# Patient Record
Sex: MALE | Race: WHITE | Employment: FULL TIME | URBAN - METROPOLITAN AREA
[De-identification: names, ages, dates, MRNs, and addresses within clinical notes are randomized per-mention and may not be internally consistent; named-entity substitution may affect disease eponyms.]

---

## 2017-06-05 PROCEDURE — 82746 ASSAY OF FOLIC ACID SERUM: CPT | Performed by: FAMILY MEDICINE

## 2017-06-05 PROCEDURE — 82607 VITAMIN B-12: CPT | Performed by: FAMILY MEDICINE

## 2017-06-13 PROBLEM — R20.0 LEFT LEG NUMBNESS: Status: ACTIVE | Noted: 2017-06-13

## 2017-06-13 PROBLEM — M21.372 LEFT FOOT DROP: Status: ACTIVE | Noted: 2017-06-13

## 2017-06-13 PROCEDURE — 86618 LYME DISEASE ANTIBODY: CPT | Performed by: OTHER

## 2017-06-13 PROCEDURE — 36415 COLL VENOUS BLD VENIPUNCTURE: CPT | Performed by: OTHER

## 2017-06-13 PROCEDURE — 83921 ORGANIC ACID SINGLE QUANT: CPT | Performed by: OTHER

## 2017-12-18 PROBLEM — I72.3 COMMON ILIAC ANEURYSM (HCC): Status: ACTIVE | Noted: 2017-12-18

## 2018-08-08 PROBLEM — S42.032A: Status: ACTIVE | Noted: 2018-08-08

## 2018-08-27 PROBLEM — S42.032A CLOSED DISPLACED FRACTURE OF ACROMIAL END OF LEFT CLAVICLE, INITIAL ENCOUNTER: Status: ACTIVE | Noted: 2018-08-27

## 2018-09-05 PROBLEM — Z47.89 ORTHOPEDIC AFTERCARE: Status: ACTIVE | Noted: 2018-09-05

## 2018-10-06 PROBLEM — M89.8X1 PAIN OF LEFT CLAVICLE: Status: ACTIVE | Noted: 2018-10-06

## 2019-03-13 PROBLEM — T85.848S PAIN FROM IMPLANTED HARDWARE, SEQUELA: Status: ACTIVE | Noted: 2019-03-13

## 2019-07-22 PROBLEM — M17.11 PRIMARY OSTEOARTHRITIS OF RIGHT KNEE: Status: ACTIVE | Noted: 2019-07-22

## 2024-04-11 ENCOUNTER — APPOINTMENT (OUTPATIENT)
Dept: GENERAL RADIOLOGY | Facility: HOSPITAL | Age: 69
End: 2024-04-11
Payer: MEDICARE

## 2024-04-11 ENCOUNTER — HOSPITAL ENCOUNTER (EMERGENCY)
Facility: HOSPITAL | Age: 69
Discharge: HOME | End: 2024-04-11
Attending: EMERGENCY MEDICINE
Payer: MEDICARE

## 2024-04-11 ENCOUNTER — APPOINTMENT (OUTPATIENT)
Dept: CT IMAGING | Facility: HOSPITAL | Age: 69
End: 2024-04-11
Attending: EMERGENCY MEDICINE
Payer: MEDICARE

## 2024-04-11 VITALS
TEMPERATURE: 97 F | BODY MASS INDEX: 25.49 KG/M2 | WEIGHT: 205 LBS | HEIGHT: 75 IN | OXYGEN SATURATION: 96 % | RESPIRATION RATE: 18 BRPM | HEART RATE: 58 BPM | DIASTOLIC BLOOD PRESSURE: 80 MMHG | SYSTOLIC BLOOD PRESSURE: 122 MMHG

## 2024-04-11 DIAGNOSIS — R91.1 PULMONARY NODULE: Primary | ICD-10-CM

## 2024-04-11 DIAGNOSIS — R07.9 ACUTE CHEST PAIN: ICD-10-CM

## 2024-04-11 LAB
ALBUMIN SERPL-MCNC: 3.4 G/DL (ref 3.4–5)
ALBUMIN/GLOB SERPL: 0.9 {RATIO} (ref 1–2)
ALP LIVER SERPL-CCNC: 75 U/L
ALT SERPL-CCNC: 32 U/L
ANION GAP SERPL CALC-SCNC: 5 MMOL/L (ref 0–18)
AST SERPL-CCNC: 40 U/L (ref 15–37)
ATRIAL RATE: 57 BPM
BASOPHILS # BLD AUTO: 0.03 X10(3) UL (ref 0–0.2)
BASOPHILS NFR BLD AUTO: 0.5 %
BILIRUB SERPL-MCNC: 0.8 MG/DL (ref 0.1–2)
BUN BLD-MCNC: 20 MG/DL (ref 9–23)
CALCIUM BLD-MCNC: 9.2 MG/DL (ref 8.5–10.1)
CHLORIDE SERPL-SCNC: 106 MMOL/L (ref 98–112)
CO2 SERPL-SCNC: 24 MMOL/L (ref 21–32)
CREAT BLD-MCNC: 0.79 MG/DL
D DIMER PPP FEU-MCNC: 1.23 UG/ML FEU (ref ?–0.67)
EGFRCR SERPLBLD CKD-EPI 2021: 97 ML/MIN/1.73M2 (ref 60–?)
EOSINOPHIL # BLD AUTO: 0.16 X10(3) UL (ref 0–0.7)
EOSINOPHIL NFR BLD AUTO: 2.5 %
ERYTHROCYTE [DISTWIDTH] IN BLOOD BY AUTOMATED COUNT: 14.9 %
GLOBULIN PLAS-MCNC: 3.8 G/DL (ref 2.8–4.4)
GLUCOSE BLD-MCNC: 96 MG/DL (ref 70–99)
HCT VFR BLD AUTO: 40.1 %
HGB BLD-MCNC: 12.8 G/DL
IMM GRANULOCYTES # BLD AUTO: 0.02 X10(3) UL (ref 0–1)
IMM GRANULOCYTES NFR BLD: 0.3 %
LYMPHOCYTES # BLD AUTO: 1.17 X10(3) UL (ref 1–4)
LYMPHOCYTES NFR BLD AUTO: 18.5 %
MCH RBC QN AUTO: 27.5 PG (ref 26–34)
MCHC RBC AUTO-ENTMCNC: 31.9 G/DL (ref 31–37)
MCV RBC AUTO: 86.2 FL
MONOCYTES # BLD AUTO: 0.68 X10(3) UL (ref 0.1–1)
MONOCYTES NFR BLD AUTO: 10.7 %
NEUTROPHILS # BLD AUTO: 4.28 X10 (3) UL (ref 1.5–7.7)
NEUTROPHILS # BLD AUTO: 4.28 X10(3) UL (ref 1.5–7.7)
NEUTROPHILS NFR BLD AUTO: 67.5 %
OSMOLALITY SERPL CALC.SUM OF ELEC: 282 MOSM/KG (ref 275–295)
P AXIS: 69 DEGREES
P-R INTERVAL: 168 MS
PLATELET # BLD AUTO: 166 10(3)UL (ref 150–450)
POTASSIUM SERPL-SCNC: 4.2 MMOL/L (ref 3.5–5.1)
PROT SERPL-MCNC: 7.2 G/DL (ref 6.4–8.2)
Q-T INTERVAL: 420 MS
QRS DURATION: 76 MS
QTC CALCULATION (BEZET): 408 MS
R AXIS: 47 DEGREES
RBC # BLD AUTO: 4.65 X10(6)UL
SODIUM SERPL-SCNC: 135 MMOL/L (ref 136–145)
T AXIS: 54 DEGREES
TROPONIN I SERPL HS-MCNC: 5 NG/L
VENTRICULAR RATE: 57 BPM
WBC # BLD AUTO: 6.3 X10(3) UL (ref 4–11)

## 2024-04-11 PROCEDURE — 71275 CT ANGIOGRAPHY CHEST: CPT | Performed by: EMERGENCY MEDICINE

## 2024-04-11 PROCEDURE — 99285 EMERGENCY DEPT VISIT HI MDM: CPT

## 2024-04-11 PROCEDURE — 80053 COMPREHEN METABOLIC PANEL: CPT

## 2024-04-11 PROCEDURE — 80053 COMPREHEN METABOLIC PANEL: CPT | Performed by: EMERGENCY MEDICINE

## 2024-04-11 PROCEDURE — 84484 ASSAY OF TROPONIN QUANT: CPT

## 2024-04-11 PROCEDURE — 93010 ELECTROCARDIOGRAM REPORT: CPT

## 2024-04-11 PROCEDURE — 96374 THER/PROPH/DIAG INJ IV PUSH: CPT

## 2024-04-11 PROCEDURE — 99284 EMERGENCY DEPT VISIT MOD MDM: CPT

## 2024-04-11 PROCEDURE — 93005 ELECTROCARDIOGRAM TRACING: CPT

## 2024-04-11 PROCEDURE — 85025 COMPLETE CBC W/AUTO DIFF WBC: CPT | Performed by: EMERGENCY MEDICINE

## 2024-04-11 PROCEDURE — 85379 FIBRIN DEGRADATION QUANT: CPT | Performed by: EMERGENCY MEDICINE

## 2024-04-11 PROCEDURE — 84484 ASSAY OF TROPONIN QUANT: CPT | Performed by: EMERGENCY MEDICINE

## 2024-04-11 PROCEDURE — 85025 COMPLETE CBC W/AUTO DIFF WBC: CPT

## 2024-04-11 PROCEDURE — 71045 X-RAY EXAM CHEST 1 VIEW: CPT | Performed by: EMERGENCY MEDICINE

## 2024-04-11 RX ORDER — KETOROLAC TROMETHAMINE 15 MG/ML
15 INJECTION, SOLUTION INTRAMUSCULAR; INTRAVENOUS ONCE
Status: COMPLETED | OUTPATIENT
Start: 2024-04-11 | End: 2024-04-11

## 2024-04-11 NOTE — ED INITIAL ASSESSMENT (HPI)
Pt c/o chest pain and neck stiffness x2 days after riding his bike outside. Pt states it hurts to cough on the left side and taking a deep breath. Pt laying prone at night, hurts more. Pt denies fevers, SOB. Denies cardiac hx

## 2024-04-11 NOTE — ED PROVIDER NOTES
Patient Seen in: [facility] Emergency Department      History     Chief Complaint   Patient presents with    Chest Pain Angina     Stated Complaint: chest pain, neck stiffness    Subjective:   HPI    This is a 69-year-old male past medical history of distal thoracic aneurysm  who presents with chest pain, left-sided neck pain.  Patient states that his left side of his neck feels slightly stiff.  He was riding his bike on Sunday.  In addition he is has pain underneath his left breast that has been fairly constant since Sunday as well.  He feels like it sometimes radiates up to his neck if he takes a deep breath.  He feels a little short of breath with exertion over the last couple of days.  He has no significant cardiac history.  He does not smoke tobacco.  He drinks alcohol socially.  He did drive to [place] last week 3 to 4 hours each way.  He has no history of PE or DVT.  No family history of clotting or early coronary artery disease.  He did not take anything to try to alleviate it.  He states it seems worse at night when he is lying flat.  Because it was not getting better he presents here for further evaluation.  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  He rides on his bike several hours per week.  He never gets chest pain while he is doing that.  He denies any myalgias or muscle pains.  His urine is clear.  He presents here for further evaluation.    Objective:   Past Medical History:    AAA (abdominal aortic aneurysm) (HCC)    Common iliac aneurysm (HCC)              Past Surgical History:   Procedure Laterality Date    Cataract extraction      Colonoscopy      2011                Social History     Socioeconomic History    Marital status:    Tobacco Use    Smoking status: Never    Smokeless tobacco: Never   Substance and Sexual Activity    Alcohol use: Yes     Alcohol/week: 6.0 standard drinks of alcohol     Types: 6 Standard drinks or equivalent per week     Comment: occ    Drug use: No    Social History Narrative    marital status:     occupation: software development    caffeine: few cups day    blood tranfusion: no    hiv exposure: no    travel: no    exercise: triathlons    mammo:      dexa:      pap:      colonoscopy:  2011 ok    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  Constitutional and vital signs reviewed.      All other systems reviewed and negative except as noted above.    Physical Exam     ED Triage Vitals [04/11/24 0932]   /82   Pulse 65   Resp 20   Temp 96.9 °F (36.1 °C)   Temp src Temporal   SpO2 95 %   O2 Device None (Room air)       Current:/84   Pulse 62   Temp 96.9 °F (36.1 °C) (Temporal)   Resp 18   Ht 190.5 cm (6' 3\")   Wt 93 kg   SpO2 98%   BMI 25.62 kg/m²         Physical Exam    GENERAL: Awake, alert oriented x3, nontoxic appearing.   SKIN: Normal, warm, and dry.  HEENT:  Pupils equally round and reactive to light. Conjuctiva clear.  Oropharynx is clear and moist.   Lungs: Clear to auscultation bilaterally with no rales, no retractions, and no wheezing.  HEART:  Regular rate and rhythm. S1 and S2. No murmurs, no rubs or gallops.   Chest: Tender over the left anterior chest wall.  No bruising or crepitus noted.  ABDOMEN: Soft, nontender and nondistended. Normoactive bowel sounds. No rebound. No guarding.   EXTREMITIES: No lower extremity edema.  No calf pain or tenderness.  Warm with brisk capillary refill.         ED Course     Labs Reviewed   COMP METABOLIC PANEL (14) - Abnormal; Notable for the following components:       Result Value    Sodium 135 (*)     AST 40 (*)     A/G Ratio 0.9 (*)     All other components within normal limits   D-DIMER - Abnormal; Notable for the following components:    D-Dimer 1.23  (*)     All other components within normal limits   CBC W/ DIFFERENTIAL - Abnormal; Notable for the following components:    HGB 12.8 (*)     All other components within normal limits   TROPONIN I HIGH SENSITIVITY - Normal   CBC WITH DIFFERENTIAL WITH PLATELET    Narrative:     The following orders were created for panel order CBC With Differential With Platelet.  Procedure                               Abnormality         Status                     ---------                               -----------         ------                     CBC W/ DIFFERENTIAL[[id number]]          Abnormal            Final result                 Please view results for these tests on the individual orders.   RAINBOW DRAW LAVENDER   RAINBOW DRAW LIGHT GREEN   RAINBOW DRAW BLUE     EKG    Rate, intervals and axes as noted on EKG Report.  Rate: 57  Rhythm: Sinus Rhythm  Reading: No acute changes               CT ANGIOGRAPHY, CHEST (CPT=71275)    Result Date: 4/11/2024  PROCEDURE:  CT ANGIOGRAPHY, CHEST (CPT=71275)  COMPARISON:  None.  INDICATIONS:  chest pain, neck stiffness  TECHNIQUE:  IV contrast-enhanced multislice CT angiography is performed through the pulmonary arterial anatomy. 3D volume renderings are generated.  Dose reduction techniques were used. Dose information is transmitted to the ACR (American College of Radiology) NRDR (National Radiology Data Registry) which includes the Dose Index Registry.  PATIENT STATED HISTORY:(As transcribed by Technologist)  Patient with left upper chest pain and neck stiffness.   CONTRAST USED:  100cc of Isovue 370  FINDINGS:  VASCULATURE:  Normal 3 vessel arch.  No evidence of aortic dissection.  No acute pulmonary embolus. LUNGS:  Dependent atelectasis bilaterally.  No focal consolidation.  No pneumothorax.  3 mm right upper lobe nodule on image number 15. MEDIASTINUM/VIDHYA:  No mediastinal or hilar adenopathy is noted.  Calcified subcarinal lymph nodes. PLEURA:  Trace left-sided pleural effusion.  CARDIAC:  No pericardial effusion. CHEST WALL:  Mild bilateral gynecomastia. LIMITED ABDOMEN:  No acute findings BONES:  Degenerative changes of the thoracic spine.            CONCLUSION:  1. No acute pulmonary embolus. 2. There is a 3 mm right upper lobe nodule.The findings include a single, incidentally detected, solid pulmonary nodule, measuring less than 6mm.  2017 guidelines from the Fleischner Society for the follow-up and management of incidentally detected indeterminate pulmonary nodules in persons at least 35 years of age depend on nodule size (average length and width) and underlying risk factors (including smoking and other risk factors). Please consider the following recommendations after clinical assessment of risk factors.  For <6mm solid nodules: In low risk patients, no follow-up required.  If suspicious morphology or upper lobe location, consider 12 month follow-up.  In high risk patients, optional CT in 12 months.   3. Trace left-sided pleural effusion.     LOCATION:  [facility]   Dictated by (CST): Hai Echeverria MD on 4/11/2024 at 12:01 PM     Finalized by (CST): Hia Echeverria MD on 4/11/2024 at 12:06 PM       XR CHEST AP PORTABLE  (CPT=71045)    Result Date: 4/11/2024  PROCEDURE:  XR CHEST AP PORTABLE  (CPT=71045)  TECHNIQUE:  AP chest radiograph was obtained.  COMPARISON:  None.  INDICATIONS:  chest pain, neck stiffness  PATIENT STATED HISTORY: (As transcribed by Technologist)  Patient stated he has been having left sided chest pressure and pain for 2 days.              CONCLUSION:  Normal heart size and vascularity.  No CHF. Minimal blunting of the CP angles. Bibasilar reticular changes and medial LLL ground-glass opacities representing either atelectasis or pneumonia.  No pneumothorax. No lymphadenopathy. Degenerative change of the spine and left shoulder joint noted.  Postsurgical changes distal left clavicle.   LOCATION:  [id number]      Dictated by ([facility]): Kwasi Hernandez MD on 4/11/2024  at 10:53 AM     Finalized by (CST): Kwasi Hernandez MD on 4/11/2024 at 10:54 AM                [facility]        This is a 69-year-old male past medical history of distal thoracic aneurysm  who presents with chest pain, left-sided neck pain.  Differential includes musculoskeletal chest wall pain, pleurisy, PE, acute coronary syndrome.      Patient placed on cardiac monitor, continuous pulse oximetry and IV line was established of normal saline.  Basic labs were obtained.  CBC: White blood cell count 6.3.  Hemoglobin 12.8.  Platelet 166.  CMP: BUN 20.  Creatinine 0.7 per glucose 96.  Bicarb 24.  Troponin negative.  D-dimer resulted at 1.23.      Given patient has elevated D-dimer, chest pain and exertional dyspnea and recent travel a CTA chest was obtained to rule out pulmonary embolism and demonstrated no evidence of pulmonary embolism.  He does have a 3 mm right upper lobe nodule.  This will need to be followed up as an outpatient.      Etiology of chest pain not clear.  It does not appear to be cardiac or pulmonary embolism.  May be pleurisy or musculoskeletal.  Would recommend NSAIDs alternating with Tylenol.  Warm compresses.  Lidocaine patch topically.  Return if worse.  He does have outpatient follow-up today with his primary care physician.  Recommend he possibly get an outpatient stress test for further evaluation.  He is comfortable with this plan.  He was discharged home in good condition.      Disposition and Plan     Clinical Impression:  1. Pulmonary nodule    2. Acute chest pain         Disposition:  Discharge  4/11/2024 12:21 pm    Follow-up:  Kwasi Rogers DO  [address] [place] [id number] [phone]    Follow up today            Medications Prescribed:  Current Discharge Medication List

## 2024-04-11 NOTE — DISCHARGE INSTRUCTIONS
Follow-up with your primary care in regards to pulmonary nodule.  You will need repeat imaging in the future to evaluate this  Ibuprofen 400 mg 3 times a day with food for 3 to 5 days, alternate with Tylenol 500 mg twice daily  Warm compresses topically to chest wall 20 minutes every few hours awake  Try lidocaine patches topically  Return if worse  Follow-up with your primary care physician as scheduled today.  May need outpatient cardiac stress test scheduled